# Patient Record
Sex: FEMALE | Race: WHITE | NOT HISPANIC OR LATINO | Employment: FULL TIME | URBAN - METROPOLITAN AREA
[De-identification: names, ages, dates, MRNs, and addresses within clinical notes are randomized per-mention and may not be internally consistent; named-entity substitution may affect disease eponyms.]

---

## 2024-08-12 ENCOUNTER — OFFICE VISIT (OUTPATIENT)
Dept: URGENT CARE | Facility: CLINIC | Age: 29
End: 2024-08-12

## 2024-08-12 VITALS
HEART RATE: 56 BPM | WEIGHT: 174.4 LBS | BODY MASS INDEX: 25.83 KG/M2 | OXYGEN SATURATION: 95 % | DIASTOLIC BLOOD PRESSURE: 60 MMHG | SYSTOLIC BLOOD PRESSURE: 110 MMHG | TEMPERATURE: 96.6 F | HEIGHT: 69 IN | RESPIRATION RATE: 17 BRPM

## 2024-08-12 DIAGNOSIS — L08.9 WOUND INFECTION: ICD-10-CM

## 2024-08-12 DIAGNOSIS — T14.8XXA WOUND INFECTION: ICD-10-CM

## 2024-08-12 PROCEDURE — 99203 OFFICE O/P NEW LOW 30 MIN: CPT | Performed by: PHYSICIAN ASSISTANT

## 2024-08-12 PROCEDURE — 3078F DIAST BP <80 MM HG: CPT | Performed by: PHYSICIAN ASSISTANT

## 2024-08-12 PROCEDURE — 3074F SYST BP LT 130 MM HG: CPT | Performed by: PHYSICIAN ASSISTANT

## 2024-08-12 RX ORDER — CEPHALEXIN 500 MG/1
500 CAPSULE ORAL 4 TIMES DAILY
Qty: 28 CAPSULE | Refills: 0 | Status: SHIPPED | OUTPATIENT
Start: 2024-08-12

## 2024-08-13 ASSESSMENT — ENCOUNTER SYMPTOMS
NEUROLOGICAL NEGATIVE: 1
CARDIOVASCULAR NEGATIVE: 1
RESPIRATORY NEGATIVE: 1
CONSTITUTIONAL NEGATIVE: 1

## 2024-08-13 NOTE — PROGRESS NOTES
"Subjective     Raquel Cueto is a very pleasant 28 y.o. female who presents with Leg Injury (X2 weeks injured right leg playing volleyball / wound and is not healing properly )            HPI  Patient had a superficial wound to the top of her right foot 2 weeks ago while playing beach volleyball.  Over the past 2 weeks the wound has opened up and failed to heal.  Now there is yellow crusty discharge with spreading erythema, edema and tenderness.  No systemic symptoms.      PMH:  has no past medical history on file.  MEDS:   Current Outpatient Medications:     cephALEXin (KEFLEX) 500 MG Cap, Take 1 Capsule by mouth 4 times a day., Disp: 28 Capsule, Rfl: 0  ALLERGIES: Not on File  SURGHX: No past surgical history on file.  SOCHX:  reports that she has never smoked. She has never used smokeless tobacco. She reports current alcohol use.  FH: family history is not on file.        Review of Systems   Constitutional: Negative.    Respiratory: Negative.     Cardiovascular: Negative.    Skin:         Wound infection right foot   Neurological: Negative.        Medications, Allergies, and current problem list reviewed today in Epic           Objective     /60   Pulse (!) 56   Temp 35.9 °C (96.6 °F)   Resp 17   Ht 1.753 m (5' 9\")   Wt 79.1 kg (174 lb 6.4 oz)   SpO2 95%   BMI 25.75 kg/m²      Physical Exam  Vitals and nursing note reviewed.   Constitutional:       General: She is not in acute distress.     Appearance: Normal appearance. She is well-developed. She is not ill-appearing, toxic-appearing or diaphoretic.   HENT:      Head: Normocephalic and atraumatic.      Right Ear: Hearing and external ear normal.      Left Ear: Hearing and external ear normal.      Nose: Nose normal.   Eyes:      General:         Right eye: No discharge.         Left eye: No discharge.      Conjunctiva/sclera: Conjunctivae normal.   Cardiovascular:      Rate and Rhythm: Normal rate and regular rhythm.      Heart sounds: " Normal heart sounds.   Pulmonary:      Effort: Pulmonary effort is normal. No respiratory distress.      Breath sounds: Normal breath sounds. No wheezing.   Musculoskeletal:      Cervical back: Normal range of motion and neck supple.   Skin:     General: Skin is warm and dry.             Comments: Superficial wound to the top of the right foot.  There is overlying granulation tissue.  There is crusty discharge on the margins.  Spreading erythema, edema and tenderness.  No red streaking or joint pain.   Neurological:      General: No focal deficit present.      Mental Status: She is alert and oriented to person, place, and time.   Psychiatric:         Mood and Affect: Mood normal.         Behavior: Behavior normal.         Thought Content: Thought content normal.         Judgment: Judgment normal.                             Assessment & Plan     This is a very pleasant 28-year-old female presenting with a wound infection for 2 weeks.  Superficial abrasion on the top of the right foot 2 weeks ago which is failed to heal.  Now having discharge, redness, swelling and tenderness.  No joint pain or systemic symptoms.  Exam consistent with a nonhealing wound and early wound infection.  No signs of spreading infection or deep tissue involvement.  OTC meds and conservative measures.  Wound care discussed.      1. Wound infection  cephALEXin (KEFLEX) 500 MG Cap            I personally reviewed prior external notes and test results pertinent to today's visit. Return to clinic or go to ED if symptoms worsen or persist. Red flag symptoms and indications for ED discussed at length. Patient/Parent/Guardian voices understanding.  AVS with post-visit instructions printed and provided or given verbally.  Follow-up with your primary care provider in 3-5 days. All side effects and potential interactions of prescribed medication discussed including allergic response, GI upset, tendon injury, rash, sedation, OCP effectiveness,  etc.    Please note that this dictation was created using voice recognition software. I have made every reasonable attempt to correct obvious errors, but I expect that there are errors of grammar and possibly content that I did not discover before finalizing the note.